# Patient Record
Sex: FEMALE | URBAN - METROPOLITAN AREA
[De-identification: names, ages, dates, MRNs, and addresses within clinical notes are randomized per-mention and may not be internally consistent; named-entity substitution may affect disease eponyms.]

---

## 2024-04-04 ENCOUNTER — APPOINTMENT (RX ONLY)
Dept: URBAN - METROPOLITAN AREA CLINIC 114 | Facility: CLINIC | Age: 59
Setting detail: DERMATOLOGY
End: 2024-04-04

## 2024-04-04 DIAGNOSIS — E55.9 VITAMIN D DEFICIENCY, UNSPECIFIED: ICD-10-CM

## 2024-04-04 DIAGNOSIS — L63.8 OTHER ALOPECIA AREATA: ICD-10-CM | Status: INADEQUATELY CONTROLLED

## 2024-04-04 DIAGNOSIS — L64.8 OTHER ANDROGENIC ALOPECIA: ICD-10-CM | Status: INADEQUATELY CONTROLLED

## 2024-04-04 PROCEDURE — ? PRESCRIPTION

## 2024-04-04 PROCEDURE — ? ADDITIONAL NOTES

## 2024-04-04 PROCEDURE — ? ORDER TESTS

## 2024-04-04 PROCEDURE — ? COUNSELING

## 2024-04-04 PROCEDURE — 99204 OFFICE O/P NEW MOD 45 MIN: CPT | Mod: 25

## 2024-04-04 PROCEDURE — 11901 INJECT SKIN LESIONS >7: CPT

## 2024-04-04 PROCEDURE — ? INTRALESIONAL KENALOG

## 2024-04-04 PROCEDURE — ? PRESCRIPTION MEDICATION MANAGEMENT

## 2024-04-04 RX ORDER — MINOXIDIL 2.5 MG/1
TABLET ORAL
Qty: 90 | Refills: 1 | Status: ERX | COMMUNITY
Start: 2024-04-04

## 2024-04-04 RX ADMIN — MINOXIDIL: 2.5 TABLET ORAL at 00:00

## 2024-04-04 ASSESSMENT — LOCATION DETAILED DESCRIPTION DERM
LOCATION DETAILED: LEFT SUPERIOR MEDIAL FOREHEAD
LOCATION DETAILED: RIGHT CENTRAL FRONTAL SCALP
LOCATION DETAILED: LEFT SUPERIOR OCCIPITAL SCALP
LOCATION DETAILED: LEFT CENTRAL PARIETAL SCALP
LOCATION DETAILED: LEFT CENTRAL OCCIPITAL SCALP
LOCATION DETAILED: LEFT INFERIOR OCCIPITAL SCALP
LOCATION DETAILED: LEFT INFERIOR PARIETAL SCALP

## 2024-04-04 ASSESSMENT — LOCATION SIMPLE DESCRIPTION DERM
LOCATION SIMPLE: LEFT FOREHEAD
LOCATION SIMPLE: RIGHT SCALP
LOCATION SIMPLE: SCALP

## 2024-04-04 ASSESSMENT — LOCATION ZONE DERM
LOCATION ZONE: SCALP
LOCATION ZONE: FACE

## 2024-04-04 ASSESSMENT — SEVERITY OF ALOPECIA TOOL: % SCALP HAIR LOST: 35

## 2024-04-04 NOTE — PROCEDURE: ADDITIONAL NOTES
Render Risk Assessment In Note?: no
Additional Notes: Advised on the autoimmune diseases. Patient has recently started taking injection for RA from Dr calvert taking retrucsine. Unable to stop the RA medication.  Unable to give patient any biological medication. Recommend a red light cap. Cortisone shot recommended. Patient has vitamin d medication. blood work done yesterday 4/3/24. \\n\\n\\nVitamin d level -\\nThyroid-\\nIron ferritin
Detail Level: Simple
Additional Notes: Patient has always noticed thinning hair in front of scalp area

## 2024-04-04 NOTE — HPI: HAIR LOSS
Previous Labs: No
How Did The Hair Loss Occur?: gradual in onset
How Severe Is Your Hair Loss?: moderate
What Hair Products Do You Use?: Using Mawie shampoo and conditioner

## 2024-04-04 NOTE — PROCEDURE: INTRALESIONAL KENALOG
Concentration Of Kenalog Solution Injected (Mg/Ml): 5.0
Medical Necessity Clause: This procedure was medically necessary because the lesions that were treated were:
How Many Mls Were Removed From The 40 Mg/Ml (5ml) Vial When Preparing The Injectable Solution?: 0
Bill For Wasted Drug (Kenalog)?: no
Kenalog Preparation: Kenalog
Lot # For Kenalog (Optional): [id number]
Which Kenalog Vial Was Used?: Kenalog 40 mg/ml (5 ml vial)
Kenalog Type Of Vial: Multiple Dose
Administered By (Optional): dr Sheffield
Detail Level: Detailed
Consent: The risks of atrophy were reviewed with the patient.
Treatment Number (Optional): 1
Expiration Date For Kenalog (Optional): dec 2025
Validate Note Data When Using Inventory: Yes
Total Volume (Ccs): 6

## 2024-04-04 NOTE — PROCEDURE: PRESCRIPTION MEDICATION MANAGEMENT
Continue Regimen: Clobetasol solution ( from RA provider)
Render In Strict Bullet Format?: No
Detail Level: Zone
Initiate Treatment: minoxidil 2.5 mg tablet \\nQuantity: 90.0 Tablet\\nSig: Take 1/2 tablet po QD x2 weeks then if not side effects take 1 tablet po QD
Initiate Treatment: Minoxidil pill 2.5 mg daily

## 2024-04-04 NOTE — PROCEDURE: ORDER TESTS
Billing Type: Third-Party Bill
Performing Laboratory: [id number]
Lab Facility: [id number]
Expected Date Of Service: 04/04/2024
Bill For Surgical Tray: no

## 2024-05-02 ENCOUNTER — APPOINTMENT (RX ONLY)
Dept: URBAN - METROPOLITAN AREA CLINIC 114 | Facility: CLINIC | Age: 59
Setting detail: DERMATOLOGY
End: 2024-05-02

## 2024-05-02 DIAGNOSIS — L64.8 OTHER ANDROGENIC ALOPECIA: ICD-10-CM

## 2024-05-02 DIAGNOSIS — L63.8 OTHER ALOPECIA AREATA: ICD-10-CM

## 2024-05-02 PROCEDURE — ? PHOTO-DOCUMENTATION

## 2024-05-02 PROCEDURE — 11901 INJECT SKIN LESIONS >7: CPT

## 2024-05-02 PROCEDURE — ? COUNSELING

## 2024-05-02 PROCEDURE — ? PRESCRIPTION MEDICATION MANAGEMENT

## 2024-05-02 PROCEDURE — ? INTRALESIONAL KENALOG

## 2024-05-02 PROCEDURE — ? ADDITIONAL NOTES

## 2024-05-02 PROCEDURE — 99213 OFFICE O/P EST LOW 20 MIN: CPT | Mod: 25

## 2024-05-02 ASSESSMENT — LOCATION ZONE DERM
LOCATION ZONE: FACE
LOCATION ZONE: SCALP

## 2024-05-02 ASSESSMENT — LOCATION DETAILED DESCRIPTION DERM
LOCATION DETAILED: LEFT INFERIOR PARIETAL SCALP
LOCATION DETAILED: LEFT SUPERIOR MEDIAL FOREHEAD
LOCATION DETAILED: RIGHT CENTRAL FRONTAL SCALP
LOCATION DETAILED: LEFT CENTRAL OCCIPITAL SCALP
LOCATION DETAILED: LEFT INFERIOR OCCIPITAL SCALP
LOCATION DETAILED: LEFT SUPERIOR POSTAURICULAR SKIN
LOCATION DETAILED: LEFT CENTRAL FRONTAL SCALP
LOCATION DETAILED: LEFT CENTRAL PARIETAL SCALP
LOCATION DETAILED: LEFT SUPERIOR PARIETAL SCALP

## 2024-05-02 ASSESSMENT — LOCATION SIMPLE DESCRIPTION DERM
LOCATION SIMPLE: LEFT FOREHEAD
LOCATION SIMPLE: SCALP
LOCATION SIMPLE: RIGHT SCALP

## 2024-05-02 NOTE — PROCEDURE: ADDITIONAL NOTES
Render Risk Assessment In Note?: no
Additional Notes: 5/2/24 \\nAdvised to continue medication and scalp solution. Patient is currently experiencing regrowth in a gray/ blonde color and is very happy with progress. \\nPatient has previous prescription of clobetasol from RA provider. Advised on hair loss shampoos and to watch for any hair loss in area.\\n\\n……….\\n\\nAdvised on the autoimmune diseases. Patient has recently started taking injection for RA from Dr calvert taking retrucsine. Unable to stop the RA medication.  Unable to give patient any biological medication. Recommend a red light cap. Cortisone shot recommended. Patient has vitamin d medication. blood work done yesterday 4/3/24. \\n\\n\\nVitamin d level -\\nThyroid-\\nIron ferritin
Detail Level: Simple
Additional Notes: Patient has always noticed thinning hair in front of scalp area

## 2024-05-02 NOTE — PROCEDURE: PRESCRIPTION MEDICATION MANAGEMENT
Continue Regimen: Clobetasol solution ( from RA provider)\\n\\nminoxidil 2.5 mg tablet \\nQuantity: 90.0 Tablet\\nSig: Take 1/2 tablet po QD x2 weeks then if not side effects take 1 tablet po QD
Render In Strict Bullet Format?: No
Detail Level: Zone
Initiate Treatment: Minoxidil pill 2.5 mg daily

## 2024-05-02 NOTE — PROCEDURE: INTRALESIONAL KENALOG
Kenalog Preparation: Kenalog
How Many Mls Were Removed From The 40 Mg/Ml (1ml) Vial When Preparing The Injectable Solution?: 0
How Many Mls Were Removed From The 40 Mg/Ml (5ml) Vial When Preparing The Injectable Solution?: 0.5
Validate Note Data When Using Inventory: Yes
Include Z78.9 (Other Specified Conditions Influencing Health Status) As An Associated Diagnosis?: No
Which Kenalog Vial Was Used?: Kenalog 10 mg/ml (5 ml vial)
Lot # For Kenalog (Optional): [id number]
Consent: The risks of atrophy were reviewed with the patient.
Kenalog Type Of Vial: Multiple Dose
Concentration Of Kenalog Solution Injected (Mg/Ml): 5.0
Administered By (Optional): dr Sheffield
Medical Necessity Clause: This procedure was medically necessary because the lesions that were treated were:
Expiration Date For Kenalog (Optional): DEC 2025
Total Volume (Ccs): 1.5
Detail Level: Detailed

## 2024-06-03 ENCOUNTER — APPOINTMENT (RX ONLY)
Dept: URBAN - METROPOLITAN AREA CLINIC 114 | Facility: CLINIC | Age: 59
Setting detail: DERMATOLOGY
End: 2024-06-03

## 2024-06-03 DIAGNOSIS — L64.8 OTHER ANDROGENIC ALOPECIA: ICD-10-CM

## 2024-06-03 DIAGNOSIS — L63.8 OTHER ALOPECIA AREATA: ICD-10-CM

## 2024-06-03 PROCEDURE — ? ADDITIONAL NOTES

## 2024-06-03 PROCEDURE — ? INTRALESIONAL KENALOG

## 2024-06-03 PROCEDURE — ? COUNSELING

## 2024-06-03 PROCEDURE — 11901 INJECT SKIN LESIONS >7: CPT

## 2024-06-03 PROCEDURE — ? PHOTO-DOCUMENTATION

## 2024-06-03 PROCEDURE — ? PRESCRIPTION MEDICATION MANAGEMENT

## 2024-06-03 PROCEDURE — 99213 OFFICE O/P EST LOW 20 MIN: CPT | Mod: 25

## 2024-06-03 ASSESSMENT — LOCATION DETAILED DESCRIPTION DERM
LOCATION DETAILED: LEFT CENTRAL OCCIPITAL SCALP
LOCATION DETAILED: RIGHT CENTRAL FRONTAL SCALP
LOCATION DETAILED: LEFT SUPERIOR MEDIAL FOREHEAD
LOCATION DETAILED: LEFT CENTRAL PARIETAL SCALP
LOCATION DETAILED: LEFT INFERIOR OCCIPITAL SCALP
LOCATION DETAILED: LEFT INFERIOR PARIETAL SCALP
LOCATION DETAILED: LEFT SUPERIOR PARIETAL SCALP

## 2024-06-03 ASSESSMENT — LOCATION ZONE DERM
LOCATION ZONE: SCALP
LOCATION ZONE: FACE

## 2024-06-03 ASSESSMENT — LOCATION SIMPLE DESCRIPTION DERM
LOCATION SIMPLE: SCALP
LOCATION SIMPLE: RIGHT SCALP
LOCATION SIMPLE: LEFT FOREHEAD

## 2024-06-03 NOTE — PROCEDURE: PRESCRIPTION MEDICATION MANAGEMENT
Continue Regimen: Clobetasol solution ( from RA provider)\\n\\nminoxidil 2.5 mg tablet \\nQuantity: 90.0 Tablet\\nSig: Take 1/2 tablet po QD x2 weeks then if not side effects take 1 tablet po QD
Render In Strict Bullet Format?: No
Detail Level: Zone
Continue Regimen: Minoxidil pill 2.5 mg daily

## 2024-06-03 NOTE — PROCEDURE: INTRALESIONAL KENALOG
Kenalog Preparation: Kenalog
How Many Mls Were Removed From The 40 Mg/Ml (1ml) Vial When Preparing The Injectable Solution?: 0
How Many Mls Were Removed From The 40 Mg/Ml (5ml) Vial When Preparing The Injectable Solution?: 0.5
Validate Note Data When Using Inventory: Yes
Include Z78.9 (Other Specified Conditions Influencing Health Status) As An Associated Diagnosis?: No
Which Kenalog Vial Was Used?: Kenalog 10 mg/ml (5 ml vial)
Lot # For Kenalog (Optional): [id number]
Consent: The risks of atrophy were reviewed with the patient.
Kenalog Type Of Vial: Multiple Dose
Concentration Of Kenalog Solution Injected (Mg/Ml): 5.0
Administered By (Optional): dr Sheffield
Medical Necessity Clause: This procedure was medically necessary because the lesions that were treated were:
Expiration Date For Kenalog (Optional): DEC 2025
Total Volume (Ccs): 1.5
Detail Level: Detailed

## 2024-06-03 NOTE — PROCEDURE: ADDITIONAL NOTES
Render Risk Assessment In Note?: no
Additional Notes: 6/3/24\\nAdvised to continue medication and scalp solution. Patient is currently experiencing regrowth and has a small grayish blonde patch of growth. Patient reports she is also using a red light treatment at the spray tan place she uses. \\n\\n\\n\\n\\n5/2/24 \\nAdvised to continue medication and scalp solution. Patient is currently experiencing regrowth in a gray/ blonde color and is very happy with progress. \\nPatient has previous prescription of clobetasol from RA provider. Advised on hair loss shampoos and to watch for any hair loss in area.\\n\\n……….\\n\\nAdvised on the autoimmune diseases. Patient has recently started taking injection for RA from Dr calvert taking retrucsine. Unable to stop the RA medication.  Unable to give patient any biological medication. Recommend a red light cap. Cortisone shot recommended. Patient has vitamin d medication. blood work done yesterday 4/3/24. \\n\\n\\nVitamin d level -\\nThyroid-\\nIron ferritin
Detail Level: Simple
Additional Notes: Patient has always noticed thinning hair in front of scalp area

## 2024-07-08 ENCOUNTER — APPOINTMENT (RX ONLY)
Dept: URBAN - METROPOLITAN AREA CLINIC 114 | Facility: CLINIC | Age: 59
Setting detail: DERMATOLOGY
End: 2024-07-08

## 2024-07-08 DIAGNOSIS — L63.8 OTHER ALOPECIA AREATA: ICD-10-CM

## 2024-07-08 DIAGNOSIS — L64.8 OTHER ANDROGENIC ALOPECIA: ICD-10-CM

## 2024-07-08 PROCEDURE — ? PHOTO-DOCUMENTATION

## 2024-07-08 PROCEDURE — 99214 OFFICE O/P EST MOD 30 MIN: CPT

## 2024-07-08 PROCEDURE — ? PRESCRIPTION MEDICATION MANAGEMENT

## 2024-07-08 PROCEDURE — ? ADDITIONAL NOTES

## 2024-07-08 PROCEDURE — ? COUNSELING

## 2024-07-08 ASSESSMENT — LOCATION ZONE DERM
LOCATION ZONE: SCALP
LOCATION ZONE: FACE

## 2024-07-08 ASSESSMENT — LOCATION SIMPLE DESCRIPTION DERM
LOCATION SIMPLE: RIGHT SCALP
LOCATION SIMPLE: LEFT FOREHEAD
LOCATION SIMPLE: SCALP

## 2024-07-08 ASSESSMENT — LOCATION DETAILED DESCRIPTION DERM
LOCATION DETAILED: RIGHT CENTRAL FRONTAL SCALP
LOCATION DETAILED: LEFT CENTRAL PARIETAL SCALP
LOCATION DETAILED: LEFT SUPERIOR MEDIAL FOREHEAD

## 2024-07-08 NOTE — PROCEDURE: ADDITIONAL NOTES
Render Risk Assessment In Note?: no
Additional Notes: 7/8/24\\nPt notes improvement with current regimen. Pt states she had headaches x 2 weeks due to previous ILK injections. Pt deferred injections today and will re-eval in 1 months. Pt denies any s/e from oral minoxidil. Plan to continue with topical rogaine and topical solution. \\n\\n6/3/24\\nAdvised to continue medication and scalp solution. Patient is currently experiencing regrowth and has a small grayish blonde patch of growth. Patient reports she is also using a red light treatment at the spray tan place she uses. \\n\\n\\n\\n\\n5/2/24 \\nAdvised to continue medication and scalp solution. Patient is currently experiencing regrowth in a gray/ blonde color and is very happy with progress. \\nPatient has previous prescription of clobetasol from RA provider. Advised on hair loss shampoos and to watch for any hair loss in area.\\n\\n……….\\n\\nAdvised on the autoimmune diseases. Patient has recently started taking injection for RA from Dr calvert taking retrucsine. Unable to stop the RA medication.  Unable to give patient any biological medication. Recommend a red light cap. Cortisone shot recommended. Patient has vitamin d medication. blood work done yesterday 4/3/24. \\n\\n\\nVitamin d level -\\nThyroid-\\nIron ferritin
Detail Level: Simple
Additional Notes: Patient has always noticed thinning hair in front of scalp area

## 2024-08-20 ENCOUNTER — APPOINTMENT (RX ONLY)
Dept: URBAN - METROPOLITAN AREA CLINIC 114 | Facility: CLINIC | Age: 59
Setting detail: DERMATOLOGY
End: 2024-08-20

## 2024-08-20 DIAGNOSIS — L63.8 OTHER ALOPECIA AREATA: ICD-10-CM

## 2024-08-20 DIAGNOSIS — L64.8 OTHER ANDROGENIC ALOPECIA: ICD-10-CM

## 2024-08-20 PROCEDURE — ? PHOTO-DOCUMENTATION

## 2024-08-20 PROCEDURE — ? COUNSELING

## 2024-08-20 PROCEDURE — ? PRESCRIPTION MEDICATION MANAGEMENT

## 2024-08-20 PROCEDURE — ? ADDITIONAL NOTES

## 2024-08-20 PROCEDURE — ? PRESCRIPTION

## 2024-08-20 PROCEDURE — 99214 OFFICE O/P EST MOD 30 MIN: CPT

## 2024-08-20 RX ORDER — MINOXIDIL 5 G/100ML
SOLUTION TOPICAL BID
Qty: 60 | Refills: 3 | Status: ERX | COMMUNITY
Start: 2024-08-20

## 2024-08-20 RX ORDER — CLOBETASOL PROPIONATE 0.5 MG/ML
SOLUTION TOPICAL
Qty: 50 | Refills: 4 | Status: ERX | COMMUNITY
Start: 2024-08-20

## 2024-08-20 RX ORDER — MINOXIDIL 2.5 MG/1
TABLET ORAL
Qty: 90 | Refills: 1 | Status: ERX

## 2024-08-20 RX ADMIN — MINOXIDIL: 5 SOLUTION TOPICAL at 00:00

## 2024-08-20 RX ADMIN — CLOBETASOL PROPIONATE: 0.5 SOLUTION TOPICAL at 00:00

## 2024-08-20 ASSESSMENT — LOCATION DETAILED DESCRIPTION DERM
LOCATION DETAILED: LEFT SUPERIOR MEDIAL FOREHEAD
LOCATION DETAILED: RIGHT CENTRAL FRONTAL SCALP
LOCATION DETAILED: LEFT CENTRAL PARIETAL SCALP

## 2024-08-20 ASSESSMENT — LOCATION SIMPLE DESCRIPTION DERM
LOCATION SIMPLE: SCALP
LOCATION SIMPLE: LEFT FOREHEAD
LOCATION SIMPLE: RIGHT SCALP

## 2024-08-20 ASSESSMENT — LOCATION ZONE DERM
LOCATION ZONE: FACE
LOCATION ZONE: SCALP

## 2024-08-20 NOTE — PROCEDURE: ADDITIONAL NOTES
Render Risk Assessment In Note?: no
Additional Notes: 8/20/24\\nPatient has had improvement in the lesions however there is a slight increase in one of the lesions. Patient is currently using minoxidil topical solution, minoxidil 2.5mg and clobetasol solution. Will continue this same regimen because patient is getting additional hair growth on the face. \\n\\n\\n7/8/24\\nPt notes improvement with current regimen. Pt states she had headaches x 2 weeks due to previous ILK injections. Pt deferred injections today and will re-eval in 1 months. Pt denies any s/e from oral minoxidil. Plan to continue with topical rogaine and topical solution. \\n\\n6/3/24\\nAdvised to continue medication and scalp solution. Patient is currently experiencing regrowth and has a small grayish blonde patch of growth. Patient reports she is also using a red light treatment at the spray tan place she uses. \\n\\n\\n\\n\\n5/2/24 \\nAdvised to continue medication and scalp solution. Patient is currently experiencing regrowth in a gray/ blonde color and is very happy with progress. \\nPatient has previous prescription of clobetasol from RA provider. Advised on hair loss shampoos and to watch for any hair loss in area.\\n\\n……….\\n\\nAdvised on the autoimmune diseases. Patient has recently started taking injection for RA from Dr calvert taking retrucsine. Unable to stop the RA medication.  Unable to give patient any biological medication. Recommend a red light cap. Cortisone shot recommended. Patient has vitamin d medication. blood work done yesterday 4/3/24. \\n\\n\\nVitamin d level -\\nThyroid-\\nIron ferritin
Detail Level: Simple
Additional Notes: Patient has always noticed thinning hair in front of scalp area